# Patient Record
Sex: FEMALE | Race: OTHER | HISPANIC OR LATINO | URBAN - METROPOLITAN AREA
[De-identification: names, ages, dates, MRNs, and addresses within clinical notes are randomized per-mention and may not be internally consistent; named-entity substitution may affect disease eponyms.]

---

## 2018-04-20 ENCOUNTER — EMERGENCY (EMERGENCY)
Facility: HOSPITAL | Age: 28
LOS: 1 days | Discharge: HOME | End: 2018-04-20
Attending: EMERGENCY MEDICINE | Admitting: EMERGENCY MEDICINE
Payer: COMMERCIAL

## 2018-04-20 VITALS
SYSTOLIC BLOOD PRESSURE: 113 MMHG | DIASTOLIC BLOOD PRESSURE: 65 MMHG | OXYGEN SATURATION: 100 % | TEMPERATURE: 99 F | WEIGHT: 125 LBS | RESPIRATION RATE: 18 BRPM | HEART RATE: 96 BPM

## 2018-04-20 VITALS
TEMPERATURE: 99 F | OXYGEN SATURATION: 98 % | HEART RATE: 64 BPM | RESPIRATION RATE: 17 BRPM | DIASTOLIC BLOOD PRESSURE: 54 MMHG | SYSTOLIC BLOOD PRESSURE: 102 MMHG

## 2018-04-20 DIAGNOSIS — O26.891 OTHER SPECIFIED PREGNANCY RELATED CONDITIONS, FIRST TRIMESTER: ICD-10-CM

## 2018-04-20 DIAGNOSIS — Z3A.00 WEEKS OF GESTATION OF PREGNANCY NOT SPECIFIED: ICD-10-CM

## 2018-04-20 DIAGNOSIS — R10.30 LOWER ABDOMINAL PAIN, UNSPECIFIED: ICD-10-CM

## 2018-04-20 PROCEDURE — 76830 TRANSVAGINAL US NON-OB: CPT | Mod: 26

## 2018-04-20 PROCEDURE — 99284 EMERGENCY DEPT VISIT MOD MDM: CPT

## 2018-04-20 NOTE — ED ADULT TRIAGE NOTE - CHIEF COMPLAINT QUOTE
pt. sent [facility] by [facility] for "rule out [facility] pregnancy" pt. denies any vaginal bleeding. pt. states " I took the pill for  Monday and Tuesday, but they called today and said to come to the ER" pt. c/o slight abdominal pain (5/10)

## 2018-04-20 NOTE — ED PROVIDER NOTE - ATTENDING CONTRIBUTION TO CARE
seen with Dr Armstrong, senior EM resident, in bed 9 of [facility], patient with intrauterine pregnancy on ultrasound, requires outpatient follow up.

## 2018-04-20 NOTE — ED PROVIDER NOTE - PROGRESS NOTE DETAILS
Labs, imaging appreciated. Will dc with ob f/u, directions to return for new or worsening symptoms. Patient is stable, vital signs stable. Patient with capacity, able to verbalize understanding of discharge instructions, return instructions, and need for close follow up.  Ryan Armstrong MD PGY-4

## 2018-04-20 NOTE — ED PROVIDER NOTE - OBJECTIVE STATEMENT
27y F A1 presents to ED to r/o ectopic pregnancy. Pt states she took " pills" at [facility] 4 days ago and 3 days ago. She began to experience abdominal pain 2 days ago, with some vaginal bleeding that has since resolved. She returned to [facility] today, where she had a US and was referred to ED to r/o ectopic pregnancy. LMP 3/15/2018.
